# Patient Record
Sex: MALE | Race: WHITE | Employment: OTHER | URBAN - NONMETROPOLITAN AREA
[De-identification: names, ages, dates, MRNs, and addresses within clinical notes are randomized per-mention and may not be internally consistent; named-entity substitution may affect disease eponyms.]

---

## 2018-04-20 ENCOUNTER — OFFICE VISIT (OUTPATIENT)
Dept: FAMILY MEDICINE CLINIC | Age: 68
End: 2018-04-20
Payer: COMMERCIAL

## 2018-04-20 VITALS
SYSTOLIC BLOOD PRESSURE: 126 MMHG | HEART RATE: 60 BPM | RESPIRATION RATE: 12 BRPM | BODY MASS INDEX: 28.39 KG/M2 | HEIGHT: 71 IN | WEIGHT: 202.8 LBS | DIASTOLIC BLOOD PRESSURE: 82 MMHG

## 2018-04-20 DIAGNOSIS — Z00.00 WELL ADULT EXAM: Primary | ICD-10-CM

## 2018-04-20 DIAGNOSIS — R35.1 NOCTURIA: ICD-10-CM

## 2018-04-20 DIAGNOSIS — I10 BENIGN ESSENTIAL HTN: ICD-10-CM

## 2018-04-20 DIAGNOSIS — R73.9 HYPERGLYCEMIA: ICD-10-CM

## 2018-04-20 PROCEDURE — 99397 PER PM REEVAL EST PAT 65+ YR: CPT | Performed by: FAMILY MEDICINE

## 2018-04-20 RX ORDER — LISINOPRIL AND HYDROCHLOROTHIAZIDE 20; 12.5 MG/1; MG/1
TABLET ORAL
Qty: 180 TABLET | Refills: 3 | Status: SHIPPED | OUTPATIENT
Start: 2018-04-20 | End: 2019-02-15 | Stop reason: SDUPTHER

## 2018-04-20 RX ORDER — ASPIRIN 81 MG/1
81 TABLET ORAL
COMMUNITY

## 2018-04-20 RX ORDER — LISINOPRIL AND HYDROCHLOROTHIAZIDE 20; 12.5 MG/1; MG/1
TABLET ORAL
Refills: 1 | COMMUNITY
Start: 2018-01-19 | End: 2018-04-20 | Stop reason: SDUPTHER

## 2018-04-20 ASSESSMENT — ENCOUNTER SYMPTOMS
EYE PAIN: 0
DIARRHEA: 0
NAUSEA: 0
SORE THROAT: 0
VOMITING: 0
CHEST TIGHTNESS: 0
BLOOD IN STOOL: 0
CONSTIPATION: 0
ABDOMINAL PAIN: 0
RHINORRHEA: 0
COUGH: 0
BACK PAIN: 0
WHEEZING: 0
SHORTNESS OF BREATH: 0

## 2018-04-20 ASSESSMENT — PATIENT HEALTH QUESTIONNAIRE - PHQ9
1. LITTLE INTEREST OR PLEASURE IN DOING THINGS: 0
2. FEELING DOWN, DEPRESSED OR HOPELESS: 0
SUM OF ALL RESPONSES TO PHQ QUESTIONS 1-9: 0
SUM OF ALL RESPONSES TO PHQ9 QUESTIONS 1 & 2: 0

## 2018-04-21 LAB
ABSOLUTE BASO #: 0.1 K/UL (ref 0–0.1)
ABSOLUTE EOS #: 0.2 K/UL (ref 0.1–0.4)
ABSOLUTE LYMPH #: 3 K/UL (ref 0.8–5.2)
ABSOLUTE MONO #: 0.6 K/UL (ref 0.1–0.9)
ABSOLUTE NEUT #: 4.5 K/UL (ref 1.3–9.1)
ALBUMIN SERPL-MCNC: 4.4 G/DL (ref 3.5–5.2)
ALK PHOSPHATASE: 64 U/L (ref 39–118)
ALT SERPL-CCNC: 20 U/L (ref 5–50)
ANION GAP SERPL CALCULATED.3IONS-SCNC: 16 MEQ/L (ref 10–19)
AST SERPL-CCNC: 18 U/L (ref 9–50)
AVERAGE GLUCOSE: 169 MG/DL (ref 66–114)
BASOPHILS RELATIVE PERCENT: 0.7 %
BILIRUB SERPL-MCNC: 0.3 MG/DL
BUN BLDV-MCNC: 15 MG/DL (ref 8–23)
CALCIUM SERPL-MCNC: 9.2 MG/DL (ref 8.5–10.5)
CHLORIDE BLD-SCNC: 95 MEQ/L (ref 95–107)
CHOLESTEROL/HDL RATIO: 6.7
CHOLESTEROL: 222 MG/DL
CO2: 26 MEQ/L (ref 19–31)
CREAT SERPL-MCNC: 0.9 MG/DL (ref 0.8–1.4)
EGFR AFRICAN AMERICAN: 103.5 ML/MIN/1.73 M2
EGFR IF NONAFRICAN AMERICAN: 89.3 ML/MIN/1.73 M2
EOSINOPHILS RELATIVE PERCENT: 2.8 %
GLUCOSE: 145 MG/DL (ref 70–99)
HBA1C MFR BLD: 7.5 % (ref 4.2–5.8)
HCT VFR BLD CALC: 44.9 % (ref 41.4–51)
HDLC SERPL-MCNC: 33 MG/DL
HEMOGLOBIN: 15.5 G/DL (ref 13.8–17)
LDL CHOLESTEROL CALCULATED: 118 MG/DL
LDL/HDL RATIO: 3.6
LYMPHOCYTE %: 36 %
MCH RBC QN AUTO: 31.9 PG (ref 27–34)
MCHC RBC AUTO-ENTMCNC: 34.5 G/DL (ref 31–36)
MCV RBC AUTO: 92.4 FL (ref 80–100)
MONOCYTES # BLD: 6.9 %
NEUTROPHILS RELATIVE PERCENT: 53.2 %
PDW BLD-RTO: 13 % (ref 10.8–14.8)
PLATELETS: 225 K/UL (ref 150–450)
POTASSIUM SERPL-SCNC: 3.9 MEQ/L (ref 3.5–5.4)
PSA, ULTRASENSITIVE: 2.97 NG/ML
RBC: 4.86 M/UL (ref 4–5.5)
SODIUM BLD-SCNC: 137 MEQ/L (ref 135–146)
TOTAL PROTEIN: 6.4 G/DL (ref 6.1–8.3)
TRIGL SERPL-MCNC: 356 MG/DL
VLDLC SERPL CALC-MCNC: 71 MG/DL
WBC: 8.4 K/UL (ref 3.7–10.8)

## 2018-04-23 ENCOUNTER — TELEPHONE (OUTPATIENT)
Dept: FAMILY MEDICINE CLINIC | Age: 68
End: 2018-04-23

## 2018-04-23 DIAGNOSIS — E11.9 TYPE 2 DIABETES MELLITUS WITHOUT COMPLICATION, WITHOUT LONG-TERM CURRENT USE OF INSULIN (HCC): Primary | ICD-10-CM

## 2018-08-14 DIAGNOSIS — E11.9 TYPE 2 DIABETES MELLITUS WITHOUT COMPLICATION, WITHOUT LONG-TERM CURRENT USE OF INSULIN (HCC): ICD-10-CM

## 2018-08-17 ENCOUNTER — TELEPHONE (OUTPATIENT)
Dept: FAMILY MEDICINE CLINIC | Age: 68
End: 2018-08-17

## 2018-08-17 ENCOUNTER — NURSE ONLY (OUTPATIENT)
Dept: FAMILY MEDICINE CLINIC | Age: 68
End: 2018-08-17
Payer: COMMERCIAL

## 2018-08-17 DIAGNOSIS — R73.02 GLUCOSE INTOLERANCE (IMPAIRED GLUCOSE TOLERANCE): Primary | ICD-10-CM

## 2018-08-17 LAB — HBA1C MFR BLD: 7.1 %

## 2018-08-17 PROCEDURE — 83036 HEMOGLOBIN GLYCOSYLATED A1C: CPT | Performed by: FAMILY MEDICINE

## 2018-08-17 NOTE — TELEPHONE ENCOUNTER
----- Message from Mal Cason MD sent at 8/17/2018  8:45 AM EDT -----  a1c improved 7.5 to 7.1  Good work. Continue present and recheck a1c in 6 months.

## 2018-09-11 ENCOUNTER — TELEPHONE (OUTPATIENT)
Dept: FAMILY MEDICINE CLINIC | Age: 68
End: 2018-09-11

## 2018-09-11 NOTE — TELEPHONE ENCOUNTER
Yohana Amadna from the [address] [place] [address] office was able to get fax to go through.

## 2019-02-15 ENCOUNTER — OFFICE VISIT (OUTPATIENT)
Dept: FAMILY MEDICINE CLINIC | Age: 69
End: 2019-02-15
Payer: MEDICARE

## 2019-02-15 VITALS
SYSTOLIC BLOOD PRESSURE: 124 MMHG | HEIGHT: 71 IN | RESPIRATION RATE: 12 BRPM | BODY MASS INDEX: 27.07 KG/M2 | DIASTOLIC BLOOD PRESSURE: 78 MMHG | WEIGHT: 193.4 LBS | HEART RATE: 68 BPM

## 2019-02-15 DIAGNOSIS — F17.210 CIGARETTE NICOTINE DEPENDENCE WITHOUT COMPLICATION: ICD-10-CM

## 2019-02-15 DIAGNOSIS — R35.1 NOCTURIA: ICD-10-CM

## 2019-02-15 DIAGNOSIS — I10 BENIGN ESSENTIAL HTN: ICD-10-CM

## 2019-02-15 DIAGNOSIS — E11.9 TYPE 2 DIABETES MELLITUS WITHOUT COMPLICATION, WITHOUT LONG-TERM CURRENT USE OF INSULIN (HCC): Primary | ICD-10-CM

## 2019-02-15 DIAGNOSIS — R73.02 GLUCOSE INTOLERANCE (IMPAIRED GLUCOSE TOLERANCE): ICD-10-CM

## 2019-02-15 LAB
ALBUMIN SERPL-MCNC: 4.5 G/DL (ref 3.5–5.1)
ALP BLD-CCNC: 104 U/L (ref 38–126)
ALT SERPL-CCNC: 35 U/L (ref 11–66)
ANION GAP SERPL CALCULATED.3IONS-SCNC: 14 MEQ/L (ref 8–16)
AST SERPL-CCNC: 25 U/L (ref 5–40)
AVERAGE GLUCOSE: 141 MG/DL (ref 70–126)
BASOPHILS # BLD: 0.4 %
BASOPHILS ABSOLUTE: 0 THOU/MM3 (ref 0–0.1)
BILIRUB SERPL-MCNC: 0.4 MG/DL (ref 0.3–1.2)
BUN BLDV-MCNC: 18 MG/DL (ref 7–22)
CALCIUM SERPL-MCNC: 9.6 MG/DL (ref 8.5–10.5)
CHLORIDE BLD-SCNC: 96 MEQ/L (ref 98–111)
CHOLESTEROL, TOTAL: 227 MG/DL (ref 100–199)
CO2: 25 MEQ/L (ref 23–33)
CREAT SERPL-MCNC: 0.8 MG/DL (ref 0.4–1.2)
EOSINOPHIL # BLD: 3.5 %
EOSINOPHILS ABSOLUTE: 0.3 THOU/MM3 (ref 0–0.4)
ERYTHROCYTE [DISTWIDTH] IN BLOOD BY AUTOMATED COUNT: 13.7 % (ref 11.5–14.5)
ERYTHROCYTE [DISTWIDTH] IN BLOOD BY AUTOMATED COUNT: 49.1 FL (ref 35–45)
GFR SERPL CREATININE-BSD FRML MDRD: > 90 ML/MIN/1.73M2
GLUCOSE BLD-MCNC: 116 MG/DL (ref 70–108)
HBA1C MFR BLD: 6.7 % (ref 4.4–6.4)
HCT VFR BLD CALC: 44.5 % (ref 42–52)
HDLC SERPL-MCNC: 33 MG/DL
HEMOGLOBIN: 15.2 GM/DL (ref 14–18)
IMMATURE GRANS (ABS): 0.03 THOU/MM3 (ref 0–0.07)
IMMATURE GRANULOCYTES: 0.4 %
LDL CHOLESTEROL CALCULATED: 139 MG/DL
LYMPHOCYTES # BLD: 32.8 %
LYMPHOCYTES ABSOLUTE: 2.5 THOU/MM3 (ref 1–4.8)
MCH RBC QN AUTO: 33.3 PG (ref 26–33)
MCHC RBC AUTO-ENTMCNC: 34.2 GM/DL (ref 32.2–35.5)
MCV RBC AUTO: 97.4 FL (ref 80–94)
MICROALB/CREAT RATIO POC: < 30 MG/G
MICROALBUMIN/CREAT UR-RTO: 10 MG/L
MONOCYTES # BLD: 7.3 %
MONOCYTES ABSOLUTE: 0.6 THOU/MM3 (ref 0.4–1.3)
NUCLEATED RED BLOOD CELLS: 0 /100 WBC
PLATELET # BLD: 236 THOU/MM3 (ref 130–400)
PMV BLD AUTO: 10.8 FL (ref 9.4–12.4)
POC CREATININE: 50 MG/DL
POTASSIUM SERPL-SCNC: 4.1 MEQ/L (ref 3.5–5.2)
PROSTATE SPECIFIC ANTIGEN: 4.71 NG/ML (ref 0–1)
RBC # BLD: 4.57 MILL/MM3 (ref 4.7–6.1)
SEG NEUTROPHILS: 55.6 %
SEGMENTED NEUTROPHILS ABSOLUTE COUNT: 4.3 THOU/MM3 (ref 1.8–7.7)
SODIUM BLD-SCNC: 135 MEQ/L (ref 135–145)
TOTAL PROTEIN: 7.2 G/DL (ref 6.1–8)
TRIGL SERPL-MCNC: 276 MG/DL (ref 0–199)
WBC # BLD: 7.7 THOU/MM3 (ref 4.8–10.8)

## 2019-02-15 PROCEDURE — 82044 UR ALBUMIN SEMIQUANTITATIVE: CPT | Performed by: FAMILY MEDICINE

## 2019-02-15 PROCEDURE — G8427 DOCREV CUR MEDS BY ELIG CLIN: HCPCS | Performed by: FAMILY MEDICINE

## 2019-02-15 PROCEDURE — 36415 COLL VENOUS BLD VENIPUNCTURE: CPT | Performed by: FAMILY MEDICINE

## 2019-02-15 PROCEDURE — 4040F PNEUMOC VAC/ADMIN/RCVD: CPT | Performed by: FAMILY MEDICINE

## 2019-02-15 PROCEDURE — 4004F PT TOBACCO SCREEN RCVD TLK: CPT | Performed by: FAMILY MEDICINE

## 2019-02-15 PROCEDURE — 3017F COLORECTAL CA SCREEN DOC REV: CPT | Performed by: FAMILY MEDICINE

## 2019-02-15 PROCEDURE — G8484 FLU IMMUNIZE NO ADMIN: HCPCS | Performed by: FAMILY MEDICINE

## 2019-02-15 PROCEDURE — 2022F DILAT RTA XM EVC RTNOPTHY: CPT | Performed by: FAMILY MEDICINE

## 2019-02-15 PROCEDURE — 1101F PT FALLS ASSESS-DOCD LE1/YR: CPT | Performed by: FAMILY MEDICINE

## 2019-02-15 PROCEDURE — G8419 CALC BMI OUT NRM PARAM NOF/U: HCPCS | Performed by: FAMILY MEDICINE

## 2019-02-15 PROCEDURE — 3046F HEMOGLOBIN A1C LEVEL >9.0%: CPT | Performed by: FAMILY MEDICINE

## 2019-02-15 PROCEDURE — 99214 OFFICE O/P EST MOD 30 MIN: CPT | Performed by: FAMILY MEDICINE

## 2019-02-15 PROCEDURE — 1123F ACP DISCUSS/DSCN MKR DOCD: CPT | Performed by: FAMILY MEDICINE

## 2019-02-15 RX ORDER — LISINOPRIL AND HYDROCHLOROTHIAZIDE 20; 12.5 MG/1; MG/1
TABLET ORAL
Qty: 180 TABLET | Refills: 3 | Status: SHIPPED | OUTPATIENT
Start: 2019-02-15 | End: 2020-06-04 | Stop reason: ALTCHOICE

## 2019-02-15 ASSESSMENT — ENCOUNTER SYMPTOMS
SORE THROAT: 0
CHEST TIGHTNESS: 0
ABDOMINAL PAIN: 0
NAUSEA: 0
SHORTNESS OF BREATH: 0
BLOOD IN STOOL: 0
WHEEZING: 0
DIARRHEA: 0
VOMITING: 0
RHINORRHEA: 0
CONSTIPATION: 0
COUGH: 0
EYE PAIN: 0
BACK PAIN: 0

## 2019-02-15 ASSESSMENT — PATIENT HEALTH QUESTIONNAIRE - PHQ9
SUM OF ALL RESPONSES TO PHQ QUESTIONS 1-9: 0
1. LITTLE INTEREST OR PLEASURE IN DOING THINGS: 0
2. FEELING DOWN, DEPRESSED OR HOPELESS: 0
SUM OF ALL RESPONSES TO PHQ9 QUESTIONS 1 & 2: 0
SUM OF ALL RESPONSES TO PHQ QUESTIONS 1-9: 0

## 2019-02-18 ENCOUNTER — TELEPHONE (OUTPATIENT)
Dept: FAMILY MEDICINE CLINIC | Age: 69
End: 2019-02-18

## 2019-02-18 DIAGNOSIS — R97.20 ELEVATED PSA: ICD-10-CM

## 2019-02-18 DIAGNOSIS — E11.9 TYPE 2 DIABETES MELLITUS WITHOUT COMPLICATION, WITHOUT LONG-TERM CURRENT USE OF INSULIN (HCC): Primary | ICD-10-CM

## 2019-07-10 ENCOUNTER — NURSE ONLY (OUTPATIENT)
Dept: FAMILY MEDICINE CLINIC | Age: 69
End: 2019-07-10

## 2019-07-10 VITALS — SYSTOLIC BLOOD PRESSURE: 128 MMHG | DIASTOLIC BLOOD PRESSURE: 68 MMHG

## 2019-07-10 DIAGNOSIS — R97.20 ELEVATED PSA: ICD-10-CM

## 2019-07-10 DIAGNOSIS — E11.9 TYPE 2 DIABETES MELLITUS WITHOUT COMPLICATION, WITHOUT LONG-TERM CURRENT USE OF INSULIN (HCC): ICD-10-CM

## 2019-07-10 LAB
AVERAGE GLUCOSE: 120 MG/DL (ref 70–126)
HBA1C MFR BLD: 6 % (ref 4.4–6.4)
PROSTATE SPECIFIC ANTIGEN: 4.32 NG/ML (ref 0–1)

## 2019-07-11 ENCOUNTER — TELEPHONE (OUTPATIENT)
Dept: FAMILY MEDICINE CLINIC | Age: 69
End: 2019-07-11

## 2019-07-11 DIAGNOSIS — R97.20 ELEVATED PSA: Primary | ICD-10-CM

## 2019-07-11 DIAGNOSIS — E11.9 TYPE 2 DIABETES MELLITUS WITHOUT COMPLICATION, WITHOUT LONG-TERM CURRENT USE OF INSULIN (HCC): ICD-10-CM

## 2020-06-04 ENCOUNTER — OFFICE VISIT (OUTPATIENT)
Dept: FAMILY MEDICINE CLINIC | Age: 70
End: 2020-06-04
Payer: MEDICARE

## 2020-06-04 VITALS
HEIGHT: 71 IN | RESPIRATION RATE: 12 BRPM | BODY MASS INDEX: 27.72 KG/M2 | WEIGHT: 198 LBS | TEMPERATURE: 98.2 F | SYSTOLIC BLOOD PRESSURE: 136 MMHG | HEART RATE: 68 BPM | DIASTOLIC BLOOD PRESSURE: 84 MMHG

## 2020-06-04 PROBLEM — G62.9 PERIPHERAL POLYNEUROPATHY: Status: ACTIVE | Noted: 2017-05-01

## 2020-06-04 LAB
ALBUMIN SERPL-MCNC: 4.3 G/DL (ref 3.5–5.1)
ALP BLD-CCNC: 60 U/L (ref 38–126)
ALT SERPL-CCNC: 16 U/L (ref 11–66)
ANION GAP SERPL CALCULATED.3IONS-SCNC: 11 MEQ/L (ref 8–16)
AST SERPL-CCNC: 21 U/L (ref 5–40)
AVERAGE GLUCOSE: 132 MG/DL (ref 70–126)
BASOPHILS # BLD: 0.6 %
BASOPHILS ABSOLUTE: 0 THOU/MM3 (ref 0–0.1)
BILIRUB SERPL-MCNC: 0.4 MG/DL (ref 0.3–1.2)
BUN BLDV-MCNC: 16 MG/DL (ref 7–22)
CALCIUM SERPL-MCNC: 9.3 MG/DL (ref 8.5–10.5)
CHLORIDE BLD-SCNC: 94 MEQ/L (ref 98–111)
CHOLESTEROL, TOTAL: 212 MG/DL (ref 100–199)
CO2: 25 MEQ/L (ref 23–33)
CREAT SERPL-MCNC: 1 MG/DL (ref 0.4–1.2)
EOSINOPHIL # BLD: 3.5 %
EOSINOPHILS ABSOLUTE: 0.3 THOU/MM3 (ref 0–0.4)
ERYTHROCYTE [DISTWIDTH] IN BLOOD BY AUTOMATED COUNT: 13.5 % (ref 11.5–14.5)
ERYTHROCYTE [DISTWIDTH] IN BLOOD BY AUTOMATED COUNT: 49.7 FL (ref 35–45)
GFR SERPL CREATININE-BSD FRML MDRD: 74 ML/MIN/1.73M2
GLUCOSE BLD-MCNC: 106 MG/DL (ref 70–108)
HBA1C MFR BLD: 6.4 % (ref 4.4–6.4)
HCT VFR BLD CALC: 43.9 % (ref 42–52)
HDLC SERPL-MCNC: 31 MG/DL
HEMOGLOBIN: 14.8 GM/DL (ref 14–18)
IMMATURE GRANS (ABS): 0.04 THOU/MM3 (ref 0–0.07)
IMMATURE GRANULOCYTES: 0.5 %
LDL CHOLESTEROL CALCULATED: 118 MG/DL
LYMPHOCYTES # BLD: 34.5 %
LYMPHOCYTES ABSOLUTE: 2.8 THOU/MM3 (ref 1–4.8)
MCH RBC QN AUTO: 33.9 PG (ref 26–33)
MCHC RBC AUTO-ENTMCNC: 33.7 GM/DL (ref 32.2–35.5)
MCV RBC AUTO: 100.7 FL (ref 80–94)
MICROALB/CREAT RATIO POC: < 30 MG/G
MICROALBUMIN/CREAT UR-RTO: 30 MG/L
MONOCYTES # BLD: 8.9 %
MONOCYTES ABSOLUTE: 0.7 THOU/MM3 (ref 0.4–1.3)
NUCLEATED RED BLOOD CELLS: 0 /100 WBC
PLATELET # BLD: 225 THOU/MM3 (ref 130–400)
PMV BLD AUTO: 10.1 FL (ref 9.4–12.4)
POC CREATININE: 200 MG/DL
POTASSIUM SERPL-SCNC: 3.9 MEQ/L (ref 3.5–5.2)
PROSTATE SPECIFIC ANTIGEN: 4.3 NG/ML (ref 0–1)
RBC # BLD: 4.36 MILL/MM3 (ref 4.7–6.1)
SEG NEUTROPHILS: 52 %
SEGMENTED NEUTROPHILS ABSOLUTE COUNT: 4.3 THOU/MM3 (ref 1.8–7.7)
SODIUM BLD-SCNC: 130 MEQ/L (ref 135–145)
TOTAL PROTEIN: 6.9 G/DL (ref 6.1–8)
TRIGL SERPL-MCNC: 314 MG/DL (ref 0–199)
WBC # BLD: 8.2 THOU/MM3 (ref 4.8–10.8)

## 2020-06-04 PROCEDURE — G8427 DOCREV CUR MEDS BY ELIG CLIN: HCPCS | Performed by: FAMILY MEDICINE

## 2020-06-04 PROCEDURE — G0438 PPPS, INITIAL VISIT: HCPCS | Performed by: FAMILY MEDICINE

## 2020-06-04 PROCEDURE — 82044 UR ALBUMIN SEMIQUANTITATIVE: CPT | Performed by: FAMILY MEDICINE

## 2020-06-04 PROCEDURE — 3046F HEMOGLOBIN A1C LEVEL >9.0%: CPT | Performed by: FAMILY MEDICINE

## 2020-06-04 PROCEDURE — G8417 CALC BMI ABV UP PARAM F/U: HCPCS | Performed by: FAMILY MEDICINE

## 2020-06-04 PROCEDURE — 1123F ACP DISCUSS/DSCN MKR DOCD: CPT | Performed by: FAMILY MEDICINE

## 2020-06-04 PROCEDURE — 2022F DILAT RTA XM EVC RTNOPTHY: CPT | Performed by: FAMILY MEDICINE

## 2020-06-04 PROCEDURE — 99213 OFFICE O/P EST LOW 20 MIN: CPT | Performed by: FAMILY MEDICINE

## 2020-06-04 PROCEDURE — 4040F PNEUMOC VAC/ADMIN/RCVD: CPT | Performed by: FAMILY MEDICINE

## 2020-06-04 PROCEDURE — 4004F PT TOBACCO SCREEN RCVD TLK: CPT | Performed by: FAMILY MEDICINE

## 2020-06-04 PROCEDURE — 3017F COLORECTAL CA SCREEN DOC REV: CPT | Performed by: FAMILY MEDICINE

## 2020-06-04 RX ORDER — LISINOPRIL 40 MG/1
40 TABLET ORAL DAILY
Qty: 90 TABLET | Refills: 3 | Status: SHIPPED | OUTPATIENT
Start: 2020-06-04

## 2020-06-04 RX ORDER — HYDROCHLOROTHIAZIDE 25 MG/1
25 TABLET ORAL EVERY MORNING
Qty: 90 TABLET | Refills: 3 | Status: SHIPPED | OUTPATIENT
Start: 2020-06-04

## 2020-06-04 SDOH — ECONOMIC STABILITY: FOOD INSECURITY: WITHIN THE PAST 12 MONTHS, THE FOOD YOU BOUGHT JUST DIDN'T LAST AND YOU DIDN'T HAVE MONEY TO GET MORE.: NEVER TRUE

## 2020-06-04 SDOH — ECONOMIC STABILITY: FOOD INSECURITY: WITHIN THE PAST 12 MONTHS, YOU WORRIED THAT YOUR FOOD WOULD RUN OUT BEFORE YOU GOT MONEY TO BUY MORE.: NEVER TRUE

## 2020-06-04 SDOH — ECONOMIC STABILITY: TRANSPORTATION INSECURITY
IN THE PAST 12 MONTHS, HAS THE LACK OF TRANSPORTATION KEPT YOU FROM MEDICAL APPOINTMENTS OR FROM GETTING MEDICATIONS?: NO

## 2020-06-04 SDOH — ECONOMIC STABILITY: INCOME INSECURITY: HOW HARD IS IT FOR YOU TO PAY FOR THE VERY BASICS LIKE FOOD, HOUSING, MEDICAL CARE, AND HEATING?: NOT HARD AT ALL

## 2020-06-04 SDOH — ECONOMIC STABILITY: TRANSPORTATION INSECURITY
IN THE PAST 12 MONTHS, HAS LACK OF TRANSPORTATION KEPT YOU FROM MEETINGS, WORK, OR FROM GETTING THINGS NEEDED FOR DAILY LIVING?: NO

## 2020-06-04 ASSESSMENT — LIFESTYLE VARIABLES
HOW MANY STANDARD DRINKS CONTAINING ALCOHOL DO YOU HAVE ON A TYPICAL DAY: 2
HOW OFTEN DO YOU HAVE A DRINK CONTAINING ALCOHOL: 3
HOW OFTEN DURING THE LAST YEAR HAVE YOU NEEDED AN ALCOHOLIC DRINK FIRST THING IN THE MORNING TO GET YOURSELF GOING AFTER A NIGHT OF HEAVY DRINKING: 0
HOW OFTEN DURING THE LAST YEAR HAVE YOU FAILED TO DO WHAT WAS NORMALLY EXPECTED FROM YOU BECAUSE OF DRINKING: 0
HOW OFTEN DURING THE LAST YEAR HAVE YOU BEEN UNABLE TO REMEMBER WHAT HAPPENED THE NIGHT BEFORE BECAUSE YOU HAD BEEN DRINKING: 0
HOW OFTEN DO YOU HAVE SIX OR MORE DRINKS ON ONE OCCASION: 0
HOW OFTEN DURING THE LAST YEAR HAVE YOU FOUND THAT YOU WERE NOT ABLE TO STOP DRINKING ONCE YOU HAD STARTED: 0
AUDIT TOTAL SCORE: 5
HOW OFTEN DURING THE LAST YEAR HAVE YOU HAD A FEELING OF GUILT OR REMORSE AFTER DRINKING: 0
HAVE YOU OR SOMEONE ELSE BEEN INJURED AS A RESULT OF YOUR DRINKING: 0
HAS A RELATIVE, FRIEND, DOCTOR, OR ANOTHER HEALTH PROFESSIONAL EXPRESSED CONCERN ABOUT YOUR DRINKING OR SUGGESTED YOU CUT DOWN: 0
AUDIT-C TOTAL SCORE: 5

## 2020-06-04 ASSESSMENT — PATIENT HEALTH QUESTIONNAIRE - PHQ9
SUM OF ALL RESPONSES TO PHQ QUESTIONS 1-9: 0
SUM OF ALL RESPONSES TO PHQ QUESTIONS 1-9: 0

## 2020-06-04 ASSESSMENT — ENCOUNTER SYMPTOMS
EYE PAIN: 0
ABDOMINAL PAIN: 0
SHORTNESS OF BREATH: 0
RHINORRHEA: 0
VOMITING: 0
COUGH: 0
CHEST TIGHTNESS: 0
CONSTIPATION: 0
WHEEZING: 0
BACK PAIN: 0
DIARRHEA: 0
BLOOD IN STOOL: 0
NAUSEA: 0
SORE THROAT: 0

## 2020-06-04 NOTE — PROGRESS NOTES
Component Value Date    CHOLHDLRATIO 6.7 (H) 2018    CHOLHDLRATIO 7.5 (H) 2015    CHOLHDLRATIO 7.1 (H) 10/24/2014     Lab Results   Component Value Date     02/15/2019    K 4.1 02/15/2019    CL 96 (L) 02/15/2019    CO2 25 02/15/2019    BUN 18 02/15/2019    CREATININE 0.8 02/15/2019    GLUCOSE 116 (H) 02/15/2019    CALCIUM 9.6 02/15/2019    PROT 7.2 02/15/2019    LABALBU 4.5 02/15/2019    BILITOT 0.4 02/15/2019    ALKPHOS 104 02/15/2019    AST 25 02/15/2019    ALT 35 02/15/2019    LABGLOM >90 02/15/2019       Lab Results   Component Value Date    LABA1C 6.0 07/10/2019     No results found for: EAG    Assessment:      Essential HTN  DM 2  Overweight      Plan:      Refill meds  Fasting blood work  Urine micral  Encouraged diet, exercise and weight loss. Dash diet  Reviewed health maintenance    Rajinder Welch received counseling on the following healthy behaviors: nutrition, exercise and medication adherence    Patient given educational materials on Diabetes, Hyperlipidemia, Nutrition, Exercise and Hypertension    I have instructed Rajinder Welch to complete a self tracking handout on Blood Sugars , Blood Pressures  and Weights and instructed them to bring it with them to his next appointment. Discussed use, benefit, and side effects of prescribed medications. Barriers to medication compliance addressed. All patient questions answered. Pt voiced understanding. Chancy Mcardle, MD   Medicare Annual Wellness Visit  Name: Ata Gutierrez Date: 2020   MRN: [id number] Sex: Male   Age: 81 y.o. Ethnicity: Non-/Non    : 1950 Race: Duran Brock is here for Medicare AWV    Screenings for behavioral, psychosocial and functional/safety risks, and cognitive dysfunction are all negative except as indicated below. These results, as well as other patient data from the [address] form, are documented in Flowsheets linked to this Encounter.     No Known BP: 136/84   Pulse: 68   Resp: 12   Temp: 98.2 °F (36.8 °C)   TempSrc: Infrared   Weight: 198 lb (89.8 kg)   Height: 5' 10.7\" (1.796 m)     Body mass index is 27.85 kg/m². Based upon direct observation of the patient, evaluation of cognition reveals recent and remote memory intact. Patient's complete Health Risk Assessment and screening values have been reviewed and are found in Flowsheets. The following problems were reviewed today and where indicated follow up appointments were made and/or referrals ordered.     Positive Risk Factor Screenings with Interventions:     Substance Abuse:  Social History     Tobacco History     Smoking Status  Current Every Day Smoker Smoking Frequency  1.5 packs/day for 50 years (76 pk yrs) Smoking Tobacco Type  Cigarettes    Smokeless Tobacco Use  Never Used          Alcohol History     Alcohol Use Status  Yes Drinks/Week  24 Cans of beer per week Amount  24.0 standard drinks of alcohol/wk          Drug Use     Drug Use Status  No          Sexual Activity     Sexually Active  Not Currently               Audit Questionnaire: Screen for Alcohol Misuse  How often do you have a drink containing alcohol?: Two to three times a week  How many standard drinks containing alcohol do you have on a typical day when drinking?: Five or six  How often do you have six or more drinks on one occasion?: Never  Audit-C Score: 5  During the past year, how often have you found that you were not able to stop drinking once you had started?: Never  During the past year, how often have you failed to do what was normally expected of you because of drinking?: Never  During the past year, how often have you needed a drink in the morning to get yourself going after a heavy drinking session?: Never  During the past year, how often have you had a feeling of guilt or remorse after drinking?: Never  During the past year, have you been unable to remember what happened the night before because you had been

## 2020-06-04 NOTE — PATIENT INSTRUCTIONS
safety. Be sure to make and go to all appointments, and call your doctor if you are having problems. It's also a good idea to know your test results and keep a list of the medicines you take. How can you care for yourself at home? Reach and stay at a healthy weight. This will lower your risk for many problems, such as obesity, diabetes, heart disease, and high blood pressure. Get at least 30 minutes of exercise on most days of the week. Walking is a good choice. You also may want to do other activities, such as running, swimming, cycling, or playing tennis or team sports. Do not smoke. Smoking can make health problems worse. If you need help quitting, talk to your doctor about stop-smoking programs and medicines. These can increase your chances of quitting for good. Protect your skin from too much sun. When you're outdoors from 10 a.m. to 4 p.m., stay in the shade or cover up with clothing and a hat with a wide brim. Wear sunglasses that block UV rays. Even when it's cloudy, put broad-spectrum sunscreen (SPF 30 or higher) on any exposed skin. See a dentist one or two times a year for checkups and to have your teeth cleaned. Wear a seat belt in the car. Follow your doctor's advice about when to have certain tests. These tests can spot problems early. For men and women  Cholesterol. Your doctor will tell you how often to have this done based on your overall health and other things that can increase your risk for heart attack and stroke. Blood pressure. Have your blood pressure checked during a routine doctor visit. Your doctor will tell you how often to check your blood pressure based on your age, your blood pressure results, and other factors. Diabetes. Ask your doctor whether you should have tests for diabetes. Vision. Experts recommend that you have yearly exams for glaucoma and other age-related eye problems. Hearing. Tell your doctor if you notice any change in your hearing.  You can have tests to you.  Where can you learn more? Go to [email or web address]://[email or web address].[email or web address] org and sign in to your [employer] account. Enter V783 in the [place] [employer] box to learn more about \"Well Visit, Over 65: Care Instructions. \"     If you do not have an account, please click on the \"Sign Up Now\" link. Current as of: August 22, 2019               Content Version: 12.5  © 2006-2020 [employer] Care instructions adapted under license by [facility] ([facility]). If you have questions about a medical condition or this instruction, always ask your healthcare professional. [address] any warranty or liability for your use of this information. Patient Education        Prostate Cancer Screening: Care Instructions  Your Care Instructions     Prostate cancer is the abnormal growth of cells in the prostate gland. This is an organ found just below a man's bladder. Screening can help find prostate cancer early. When it is found and treated early, the cancer may be cured. But it's not always treated. That's because the treatments can cause serious side effects. For most men, prostate cancer won't shorten their lives, especially if they are older and the cancer is growing slowly. Prostate cancer is the second most common type of cancer in men. Most cases occur in men older than 72. The disease runs in families. And it's more common in -American men. Follow-up care is a key part of your treatment and safety. Be sure to make and go to all appointments, and call your doctor if you are having problems. It's also a good idea to know your test results and keep a list of the medicines you take. What is the screening test for prostate cancer? The main screening test for prostate cancer is the prostate-specific antigen (PSA) test. This is a blood test that measures how much PSA is in your blood. A high level may mean that you have an enlarged prostate, an infection, or cancer.   Along with the right for everyone, because it can cause serious bleeding. See your doctor regularly. You may need to see the doctor more often at first or until your blood pressure comes down. If you are taking blood pressure medicine, talk to your doctor before you take decongestants or anti-inflammatory medicine, such as ibuprofen. Some of these medicines can raise blood pressure. Learn how to check your blood pressure at home. Lifestyle changes  Stay at a healthy weight. This is especially important if you put on weight around the waist. Losing even 10 pounds can help you lower your blood pressure. If your doctor recommends it, get more exercise. Walking is a good choice. Bit by bit, increase the amount you walk every day. Try for at least 30 minutes on most days of the week. You also may want to swim, bike, or do other activities. Avoid or limit alcohol. Talk to your doctor about whether you can drink any alcohol. Try to limit how much sodium you eat to less than 2,300 milligrams (mg) a day. Your doctor may ask you to try to eat less than 1,500 mg a day. Eat plenty of fruits (such as bananas and oranges), vegetables, legumes, whole grains, and low-fat dairy products. Lower the amount of saturated fat in your diet. Saturated fat is found in animal products such as milk, cheese, and meat. Limiting these foods may help you lose weight and also lower your risk for heart disease. Do not smoke. Smoking increases your risk for heart attack and stroke. If you need help quitting, talk to your doctor about stop-smoking programs and medicines. These can increase your chances of quitting for good. When should you call for help? Call  911 anytime you think you may need emergency care. This may mean having symptoms that suggest that your blood pressure is causing a serious heart or blood vessel problem. Your blood pressure may be over 180/120. For example, call 911 if:  You have symptoms of a heart attack.  These may muscles. Stretches that allow you to move your joints and muscles through their full range of motion. Stretching helps you be more flexible and avoid injury. What are the benefits of physical activity? Being active is one of the best things you can do for your health. It helps you to:  Feel stronger and have more energy to do all the things you like to do. Focus better at school or work. Feel, think, and sleep better. Reach and stay at a healthy weight. Lose fat and build lean muscle. Lower your risk for serious health problems. Keep your bones, muscles, and joints strong. How can you make physical activity part of your life? Get at least 30 minutes of exercise on most days of the week. Walking is a good choice. You also may want to do other activities, such as running, swimming, cycling, or playing tennis or team sports. Pick activities that you like--ones that make your heart beat faster, your muscles stronger, and your muscles and joints more flexible. If you find more than one thing you like doing, do them all. You don't have to do the same thing every day. Get your heart pumping every day. Any activity that makes your heart beat faster and keeps it at that rate for a while counts. Here are some great ways to get your heart beating faster:  Go for a brisk walk, run, or bike ride. Go for a hike or swim. Go in-line skating. Play a game of touch football, basketball, or soccer. Ride a bike. Play tennis or racquetball. Climb stairs. Even some household chores can be aerobic--just do them at a faster pace. Vacuuming, raking or mowing the lawn, sweeping the garage, and washing and waxing the car all can help get your heart rate up. Strengthen your muscles during the week. You don't have to lift heavy weights or grow big, bulky muscles to get stronger. Doing a few simple activities that make your muscles work against, or \"resist,\" something can help you get stronger.   For example, you can:  Do

## 2020-06-05 ENCOUNTER — TELEPHONE (OUTPATIENT)
Dept: FAMILY MEDICINE CLINIC | Age: 70
End: 2020-06-05

## 2020-06-05 NOTE — TELEPHONE ENCOUNTER
----- Message from Elmira Bennett MD sent at 6/5/2020  6:18 AM EDT -----  Psa stable at 4.3. A1C is good at 6.4  Recheck a1c in 6 months. CMP is good except sodium low at 130. Can increase salt in his diet. Recheck bmp in 2 weeks. Cholesterol at 212  ( 227 last year). CBC is ok.

## 2020-07-07 ENCOUNTER — NURSE ONLY (OUTPATIENT)
Dept: FAMILY MEDICINE CLINIC | Age: 70
End: 2020-07-07
Payer: MEDICARE

## 2020-07-07 LAB
ANION GAP SERPL CALCULATED.3IONS-SCNC: 12 MEQ/L (ref 8–16)
BUN BLDV-MCNC: 18 MG/DL (ref 7–22)
CALCIUM SERPL-MCNC: 9.2 MG/DL (ref 8.5–10.5)
CHLORIDE BLD-SCNC: 101 MEQ/L (ref 98–111)
CO2: 24 MEQ/L (ref 23–33)
CREAT SERPL-MCNC: 1 MG/DL (ref 0.4–1.2)
FOLATE: 6.7 NG/ML (ref 4.8–24.2)
GFR SERPL CREATININE-BSD FRML MDRD: 74 ML/MIN/1.73M2
GLUCOSE BLD-MCNC: 140 MG/DL (ref 70–108)
POTASSIUM SERPL-SCNC: 4 MEQ/L (ref 3.5–5.2)
SODIUM BLD-SCNC: 137 MEQ/L (ref 135–145)
VITAMIN B-12: 575 PG/ML (ref 211–911)

## 2020-07-07 PROCEDURE — 36415 COLL VENOUS BLD VENIPUNCTURE: CPT | Performed by: FAMILY MEDICINE

## 2020-07-08 ENCOUNTER — TELEPHONE (OUTPATIENT)
Dept: FAMILY MEDICINE CLINIC | Age: 70
End: 2020-07-08

## 2020-07-08 NOTE — TELEPHONE ENCOUNTER
----- Message from Lukas La MD sent at 7/8/2020  6:50 AM EDT -----  BMP is improved. Sodium back to normal.  Glucose at 140.

## 2020-07-12 LAB — VITAMIN B1 WHOLE BLOOD: 125 NMOL/L (ref 70–180)

## 2020-12-16 ENCOUNTER — TELEPHONE (OUTPATIENT)
Dept: FAMILY MEDICINE CLINIC | Age: 70
End: 2020-12-16

## 2020-12-16 NOTE — LETTER
[address] [place] [address]  Dept: [phone]  Loc: [facility]        12/16/2020    [address] [facility]      Dear Jamey Sol: This letter is a reminder that you may have diagnostic testing that has not been completed. It is important to your well-being that these test(s) are performed. Please call our office at Dept: [phone] for additional information on the outstanding tests or let us know if they have been completed so we may update your chart.     Sincerely,        Josef Cleveland MD

## 2021-04-05 ENCOUNTER — TELEPHONE (OUTPATIENT)
Dept: FAMILY MEDICINE CLINIC | Age: 71
End: 2021-04-05

## 2021-04-05 ENCOUNTER — NURSE ONLY (OUTPATIENT)
Dept: FAMILY MEDICINE CLINIC | Age: 71
End: 2021-04-05
Payer: MEDICARE

## 2021-04-05 DIAGNOSIS — E11.9 TYPE 2 DIABETES MELLITUS WITHOUT COMPLICATION, WITHOUT LONG-TERM CURRENT USE OF INSULIN (HCC): Primary | ICD-10-CM

## 2021-04-05 LAB — HBA1C MFR BLD: 6.5 % (ref 4.3–5.7)

## 2021-04-05 PROCEDURE — 83036 HEMOGLOBIN GLYCOSYLATED A1C: CPT | Performed by: FAMILY MEDICINE

## 2021-04-05 NOTE — TELEPHONE ENCOUNTER
----- Message from Promise Duvall MD sent at 4/5/2021 10:22 AM EDT -----  a1c is good at 6.5. Recheck a1c in 6 months.